# Patient Record
Sex: FEMALE | Race: WHITE | NOT HISPANIC OR LATINO | Employment: FULL TIME | URBAN - METROPOLITAN AREA
[De-identification: names, ages, dates, MRNs, and addresses within clinical notes are randomized per-mention and may not be internally consistent; named-entity substitution may affect disease eponyms.]

---

## 2023-11-06 ENCOUNTER — TELEPHONE (OUTPATIENT)
Dept: OBSTETRICS AND GYNECOLOGY | Facility: CLINIC | Age: 38
End: 2023-11-06
Payer: COMMERCIAL

## 2023-11-07 RX ORDER — NORGESTIMATE AND ETHINYL ESTRADIOL 7DAYSX3 LO
1 KIT ORAL DAILY
COMMUNITY
End: 2023-11-08

## 2023-11-07 RX ORDER — OXYCODONE AND ACETAMINOPHEN 5; 325 MG/1; MG/1
1 TABLET ORAL EVERY 4 HOURS PRN
COMMUNITY
End: 2023-11-08 | Stop reason: WASHOUT

## 2023-11-07 RX ORDER — DESOGESTREL AND ETHINYL ESTRADIOL 0.15-0.03
1 KIT ORAL DAILY
COMMUNITY
End: 2024-01-22

## 2023-11-07 RX ORDER — SERTRALINE HYDROCHLORIDE 50 MG/1
1 TABLET, FILM COATED ORAL DAILY
COMMUNITY
Start: 2023-09-26

## 2023-11-08 ENCOUNTER — OFFICE VISIT (OUTPATIENT)
Dept: OBSTETRICS AND GYNECOLOGY | Facility: CLINIC | Age: 38
End: 2023-11-08
Payer: COMMERCIAL

## 2023-11-08 VITALS
WEIGHT: 135 LBS | DIASTOLIC BLOOD PRESSURE: 78 MMHG | BODY MASS INDEX: 23.05 KG/M2 | HEIGHT: 64 IN | SYSTOLIC BLOOD PRESSURE: 115 MMHG

## 2023-11-08 DIAGNOSIS — N63.13 MASS OF LOWER OUTER QUADRANT OF RIGHT BREAST: Primary | ICD-10-CM

## 2023-11-08 PROCEDURE — 99213 OFFICE O/P EST LOW 20 MIN: CPT | Performed by: OBSTETRICS & GYNECOLOGY

## 2023-11-08 PROCEDURE — 1036F TOBACCO NON-USER: CPT | Performed by: OBSTETRICS & GYNECOLOGY

## 2023-11-08 RX ORDER — METOPROLOL SUCCINATE 25 MG/1
25 TABLET, EXTENDED RELEASE ORAL DAILY
COMMUNITY

## 2023-11-08 RX ORDER — GLUCOSAM/CHONDRO/HERB 149/HYAL 750-100 MG
TABLET ORAL
COMMUNITY

## 2023-11-08 RX ORDER — MAGNESIUM GLUCONATE 27 MG(500)
27 TABLET ORAL 2 TIMES DAILY
COMMUNITY

## 2023-11-08 RX ORDER — BISMUTH SUBSALICYLATE 262 MG
1 TABLET,CHEWABLE ORAL DAILY
COMMUNITY

## 2023-11-08 ASSESSMENT — LIFESTYLE VARIABLES
AUDIT-C TOTAL SCORE: 3
HOW OFTEN DO YOU HAVE SIX OR MORE DRINKS ON ONE OCCASION: NEVER
HOW OFTEN DO YOU HAVE A DRINK CONTAINING ALCOHOL: 2-4 TIMES A MONTH
SKIP TO QUESTIONS 9-10: 0
HOW MANY STANDARD DRINKS CONTAINING ALCOHOL DO YOU HAVE ON A TYPICAL DAY: 3 OR 4

## 2023-11-08 ASSESSMENT — PAIN SCALES - GENERAL: PAINLEVEL: 0-NO PAIN

## 2023-11-08 ASSESSMENT — PATIENT HEALTH QUESTIONNAIRE - PHQ9
SUM OF ALL RESPONSES TO PHQ9 QUESTIONS 1 & 2: 0
1. LITTLE INTEREST OR PLEASURE IN DOING THINGS: NOT AT ALL
2. FEELING DOWN, DEPRESSED OR HOPELESS: NOT AT ALL

## 2023-11-08 NOTE — PROGRESS NOTES
"Subjective   Sultana Campbell is an 38 y.o. female who presents for breast complaint  Breast lump: right breast after last cycle  Pain: absent  Duration: couple of days  Nipple discharge: no  Axillary lymph nodes: no  Nipple retraction: absent  Skin changes: absent  Change during menstrual cycle: yes    ROS:  No f/c/s    Objective   /78   Ht 1.626 m (5' 4\")   Wt 61.2 kg (135 lb)   LMP 10/31/2023   BMI 23.17 kg/m²   Constitutional: well developed, well nourished in no acute distress  Breast: previous surgical scars noted, no lymphadenopathy, skin changes, palpable masses noted on left, within right breast tissue palpable mass but feels to be similar to fatty tissue noted in the lower outer quadrant near surgical scar    Assessment/Plan   Problem List Items Addressed This Visit    None  Visit Diagnoses         Codes    Mass of lower outer quadrant of right breast    -  Primary N63.13    Relevant Orders    BI mammo bilateral diagnostic        Pt with extensive history in her family of breast cancer, maternal first cousin BRCA positive, plan obtain cancer risk assessment today and did offer genetic testing as well.  Patient wants to wait imaging as well as results prior to doing any blood work.  "

## 2023-11-13 ENCOUNTER — HOSPITAL ENCOUNTER (OUTPATIENT)
Dept: RADIOLOGY | Facility: HOSPITAL | Age: 38
Discharge: HOME | End: 2023-11-13
Payer: COMMERCIAL

## 2023-11-13 VITALS — BODY MASS INDEX: 22.2 KG/M2 | HEIGHT: 64 IN | WEIGHT: 130 LBS

## 2023-11-13 DIAGNOSIS — N63.13 MASS OF LOWER OUTER QUADRANT OF RIGHT BREAST: ICD-10-CM

## 2023-11-13 PROCEDURE — 77062 BREAST TOMOSYNTHESIS BI: CPT

## 2023-11-13 PROCEDURE — 76642 ULTRASOUND BREAST LIMITED: CPT | Mod: RT

## 2024-01-21 DIAGNOSIS — Z30.41 ORAL CONTRACEPTIVE PILL SURVEILLANCE: Primary | ICD-10-CM

## 2024-01-22 RX ORDER — DESOGESTREL AND ETHINYL ESTRADIOL 0.15-0.03
KIT ORAL
Qty: 84 TABLET | Refills: 1 | Status: SHIPPED | OUTPATIENT
Start: 2024-01-22 | End: 2024-05-01 | Stop reason: SDUPTHER

## 2024-05-01 ENCOUNTER — OFFICE VISIT (OUTPATIENT)
Dept: OBSTETRICS AND GYNECOLOGY | Facility: CLINIC | Age: 39
End: 2024-05-01
Payer: COMMERCIAL

## 2024-05-01 VITALS — BODY MASS INDEX: 23.65 KG/M2 | SYSTOLIC BLOOD PRESSURE: 114 MMHG | DIASTOLIC BLOOD PRESSURE: 62 MMHG | WEIGHT: 137.8 LBS

## 2024-05-01 DIAGNOSIS — Z30.41 ORAL CONTRACEPTIVE PILL SURVEILLANCE: ICD-10-CM

## 2024-05-01 DIAGNOSIS — Z12.31 SCREENING MAMMOGRAM FOR BREAST CANCER: ICD-10-CM

## 2024-05-01 DIAGNOSIS — Z01.419 ENCOUNTER FOR ANNUAL ROUTINE GYNECOLOGICAL EXAMINATION: Primary | ICD-10-CM

## 2024-05-01 DIAGNOSIS — Z80.3 FAMILY HISTORY OF BREAST CANCER: ICD-10-CM

## 2024-05-01 DIAGNOSIS — Z91.89 INCREASED RISK OF BREAST CANCER: ICD-10-CM

## 2024-05-01 DIAGNOSIS — R92.30 DENSE BREAST TISSUE: ICD-10-CM

## 2024-05-01 PROCEDURE — 99396 PREV VISIT EST AGE 40-64: CPT | Performed by: OBSTETRICS & GYNECOLOGY

## 2024-05-01 PROCEDURE — 1036F TOBACCO NON-USER: CPT | Performed by: OBSTETRICS & GYNECOLOGY

## 2024-05-01 RX ORDER — DESOGESTREL AND ETHINYL ESTRADIOL 0.15-0.03
KIT ORAL
Qty: 84 TABLET | Refills: 3 | Status: SHIPPED | OUTPATIENT
Start: 2024-05-01

## 2024-05-01 ASSESSMENT — LIFESTYLE VARIABLES
SKIP TO QUESTIONS 9-10: 0
AUDIT-C TOTAL SCORE: 2
HOW OFTEN DO YOU HAVE SIX OR MORE DRINKS ON ONE OCCASION: LESS THAN MONTHLY
HOW OFTEN DO YOU HAVE A DRINK CONTAINING ALCOHOL: MONTHLY OR LESS
HOW MANY STANDARD DRINKS CONTAINING ALCOHOL DO YOU HAVE ON A TYPICAL DAY: 1 OR 2

## 2024-05-01 ASSESSMENT — ENCOUNTER SYMPTOMS
HEADACHES: 0
FEVER: 0
OCCASIONAL FEELINGS OF UNSTEADINESS: 0
DIZZINESS: 0
COLOR CHANGE: 0
DEPRESSION: 0
FATIGUE: 0
NAUSEA: 0
VOMITING: 0
CHILLS: 0
ABDOMINAL PAIN: 0
DYSURIA: 0
COUGH: 0
LOSS OF SENSATION IN FEET: 0
UNEXPECTED WEIGHT CHANGE: 0
SHORTNESS OF BREATH: 0

## 2024-05-01 ASSESSMENT — PATIENT HEALTH QUESTIONNAIRE - PHQ9
1. LITTLE INTEREST OR PLEASURE IN DOING THINGS: NOT AT ALL
SUM OF ALL RESPONSES TO PHQ9 QUESTIONS 1 & 2: 0
2. FEELING DOWN, DEPRESSED OR HOPELESS: NOT AT ALL

## 2024-05-01 ASSESSMENT — PAIN SCALES - GENERAL: PAINLEVEL: 0-NO PAIN

## 2024-05-01 NOTE — PROGRESS NOTES
Annual  Subjective   Sultana Campbell is a 39 y.o. female who is here for a routine exam.     Complaints:   none  Periods: regular   Dysmenorrhea:  none    Current contraception: ocps  History of abnormal Pap smear: no  History of abnormal mammogram: no      OB History          2    Para   2    Term   2            AB        Living   2         SAB        IAB        Ectopic        Multiple        Live Births                      Review of Systems   Constitutional:  Negative for chills, fatigue, fever and unexpected weight change.   Respiratory:  Negative for cough and shortness of breath.    Gastrointestinal:  Negative for abdominal pain, nausea and vomiting.   Genitourinary:  Negative for dyspareunia, dysuria, pelvic pain and vaginal discharge.   Skin:  Negative for color change and rash.   Neurological:  Negative for dizziness and headaches.       Objective   /62   Wt 62.5 kg (137 lb 12.8 oz)   LMP 2024 (Exact Date)   BMI 23.65 kg/m²        General:   Alert and oriented, in no acute distress   Neck: Supple. No visible thyromegaly.    Breast/Axilla: Normal to palpation bilaterally without masses, skin changes, or nipple discharge.    Abdomen: Soft, non-tender, without masses or organomegaly   Vulva: Normal architecture without erythema, masses, or lesions.    Vagina: Normal mucosa without lesions, masses, or atrophy. No abnormal vaginal discharge.    Cervix: Normal without masses, lesions, or signs of cervicitis   Uterus: Normal, mobile, non-enlarged uterus   Adnexa: Normal without masses or lesions   Pelvic Floor normal   Psych Normal affect. Normal mood.      Assessment/Plan   Problem List Items Addressed This Visit    None  Visit Diagnoses         Codes    Encounter for annual routine gynecological examination    -  Primary Z01.419    Increased risk of breast cancer     Z91.89    Dense breast tissue     R92.30    Family history of breast cancer     Z80.3    Oral contraceptive pill  surveillance     Z30.41    Relevant Medications    desogestreL-ethinyl estradioL (Apri) 0.15-0.03 mg tablet    Screening mammogram for breast cancer     Z12.31    Relevant Orders    BI mammo bilateral screening tomosynthesis          Will check w/insurance re: breast MRI and let us know  Routine annual    Pap due 2026  Mammogram ordered    Johnathan Sands MD

## 2025-02-08 ENCOUNTER — OFFICE VISIT (OUTPATIENT)
Dept: URGENT CARE | Age: 40
End: 2025-02-08
Payer: COMMERCIAL

## 2025-02-08 VITALS
SYSTOLIC BLOOD PRESSURE: 109 MMHG | HEART RATE: 74 BPM | TEMPERATURE: 98.9 F | RESPIRATION RATE: 16 BRPM | DIASTOLIC BLOOD PRESSURE: 65 MMHG | OXYGEN SATURATION: 99 %

## 2025-02-08 DIAGNOSIS — J02.0 STREPTOCOCCAL PHARYNGITIS: ICD-10-CM

## 2025-02-08 DIAGNOSIS — J02.9 SORETHROAT: Primary | ICD-10-CM

## 2025-02-08 LAB — POC RAPID STREP: POSITIVE

## 2025-02-08 RX ORDER — AMOXICILLIN 875 MG/1
875 TABLET, FILM COATED ORAL 2 TIMES DAILY
Qty: 20 TABLET | Refills: 0 | Status: SHIPPED | OUTPATIENT
Start: 2025-02-08 | End: 2025-02-18

## 2025-02-08 ASSESSMENT — ENCOUNTER SYMPTOMS
FATIGUE: 1
SORE THROAT: 1
DIARRHEA: 1
FEVER: 1
HEADACHES: 1

## 2025-02-08 ASSESSMENT — PAIN SCALES - GENERAL: PAINLEVEL_OUTOF10: 5

## 2025-02-08 NOTE — PROGRESS NOTES
Subjective   Patient ID: Sultana Campbell is a 39 y.o. female. They present today with a chief complaint of Sore Throat (Sorethroat ,headache, loose stool started on Thursday. Throat continues to be sore left side more than right. Concerned about strep.).    History of Present Illness  Patient presents with symptoms of sore throat, headache, diarrhea on Thursday. Reports history of frequent strep infections. Oldest daughter has been treated for Strep recently.           Past Medical History  Allergies as of 2025    (No Known Allergies)       (Not in a hospital admission)       Past Medical History:   Diagnosis Date    Anxiety     Melanoma (Multi)     ; abdomen    Skin cancer     2019; squamous cell ca       Past Surgical History:   Procedure Laterality Date    APPENDECTOMY  2024    BREAST SURGERY      reduction     SECTION, LOW TRANSVERSE      2016    LASIK      OTHER SURGICAL HISTORY      wide local exicision for melanoma    OTHER SURGICAL HISTORY      Moh's on abd for sq cell ca        reports that she has never smoked. She has never used smokeless tobacco. She reports current alcohol use of about 3.0 standard drinks of alcohol per week. She reports that she does not use drugs.    Review of Systems  Review of Systems   Constitutional:  Positive for fatigue and fever.   HENT:  Positive for sore throat.    Gastrointestinal:  Positive for diarrhea.   Neurological:  Positive for headaches.                                  Objective    Vitals:    25 1755   BP: 109/65   Pulse: 74   Resp: 16   Temp: 37.2 °C (98.9 °F)   TempSrc: Oral   SpO2: 99%     Patient's last menstrual period was 2025 (exact date).    Physical Exam  Vitals reviewed.   General: Vitals Noted. No distress. Normocephalic.  Cardiovascular:     Heart sounds: Normal heart sounds, S1 normal and S2 normal. No murmur heard.     No friction rub.   Pulmonary:      Effort: Pulmonary effort is normal.      Breath  sounds: Normal breath sounds and air entry. Lungs clear to auscultation bilaterally  HEENT: Left right TM is within normal limits with adequately infection visible landmarks.  No drainage.  EOMI, normal conjunctiva, patent nares, Normal OP, No maxillary and frontal sinus tenderness on palpation is present. Pharynx and tonsils are significantly hyperemic, and with exudate.   Neck: Supple with no adenopathy.  Lymphadenopathy:   No cervical adenopathy on palpation  Lower Body: No right inguinal adenopathy. No left inguinal adenopathy.   Abdominal:      Palpations: There is no hepatomegaly, splenomegaly or mass. Abdomen is soft, non-tender, and non-distended. No suprapubic tenderness. No CVA tenderness.   Skin:     Comments: no rash   Neurological:      Cranial Nerves: Cranial nerves 2-12 are intact.      Sensory: No sensory deficit.      Motor: Motor function is intact.      Deep Tendon Reflexes: Reflexes are normal and symmetric.       Procedures    Point of Care Test & Imaging Results from this visit  Results for orders placed or performed in visit on 02/08/25   POCT rapid strep A manually resulted   Result Value Ref Range    POC Rapid Strep Positive (A) Negative      No results found.    Diagnostic study results (if any) were reviewed by DANIEL Temple.    Assessment/Plan   Allergies, medications, history, and pertinent labs/EKGs/Imaging reviewed by DANIEL Temple.     Medical Decision Making  On exam patient is non toxic, in no distress. Testing for Strep in the office is positive for Strep. On examination as above, no concerns for bronchitis, PNA given short duration of symptoms and examination. Lungs are clean on auscultation, no wheezing or rales. No concerns for sinus infection given no maxillary or frontal tenderness on palpation.  Will treat with Amoxicillin today.   Will be discharged with instructions to increase fluid intake, use of OTC for symptoms relief. Advised to follow up with PCP  for further management. Reviewed with patient signs and symptoms significant to present to ED. Patient verbalized understanding and agreed with the plan.       Orders and Diagnoses  Diagnoses and all orders for this visit:  Sorethroat  -     POCT rapid strep A manually resulted  Streptococcal pharyngitis  -     amoxicillin (Amoxil) 875 mg tablet; Take 1 tablet (875 mg) by mouth 2 times a day for 10 days.      Medical Admin Record      Patient disposition: Home    Electronically signed by DANIEL Temple  6:19 PM       inability to enjoy life/inability or reluctance to perform ADLs